# Patient Record
Sex: FEMALE
[De-identification: names, ages, dates, MRNs, and addresses within clinical notes are randomized per-mention and may not be internally consistent; named-entity substitution may affect disease eponyms.]

---

## 2023-03-14 ENCOUNTER — NURSE TRIAGE (OUTPATIENT)
Dept: OTHER | Facility: CLINIC | Age: 53
End: 2023-03-14

## 2023-03-14 NOTE — TELEPHONE ENCOUNTER
Location of patient: [place]    Received call from [facility] [place] [facility] with Red Flag Complaint. Salvatore Cherry MRN: [id number]    Provider: Moises Carlos MD    Subjective: Caller states \"It started about 2 weeks ago. It's the L groin area in the crease between my legs. I went to the hospital and made them keep me. They did an xray and there is disc deterioration. I had a follow up and don't have another until April 4th or 5th. They are going to do a biopsy on my uterus. \"     Current Symptoms: L groin pain    Associated Symptoms:  nausea, vomiting  yesterday    Pain Severity: 8/10    Temperature: \"feels warm\"    What has been tried: tylenol    Recommended disposition: [facility] or ED    Care advice provided, patient verbalizes understanding; denies any other questions or concerns. Outcome: Patient agrees to be evaluated at [facility] or ED.     This triage is a result of a call to the [place] [id number]    Reason for Disposition   Patient sounds very sick or weak to the triager    Protocols used: Pelvic Pain - [facility]